# Patient Record
Sex: FEMALE | Race: WHITE | NOT HISPANIC OR LATINO | Employment: OTHER | URBAN - METROPOLITAN AREA
[De-identification: names, ages, dates, MRNs, and addresses within clinical notes are randomized per-mention and may not be internally consistent; named-entity substitution may affect disease eponyms.]

---

## 2024-11-13 ENCOUNTER — OFFICE VISIT (OUTPATIENT)
Dept: OPTOMETRY | Facility: CLINIC | Age: 39
End: 2024-11-13
Payer: COMMERCIAL

## 2024-11-13 DIAGNOSIS — S05.8X2A COMMOTIO RETINAE OF LEFT EYE, INITIAL ENCOUNTER: ICD-10-CM

## 2024-11-13 DIAGNOSIS — H20.9 TRAUMATIC IRITIS: Primary | ICD-10-CM

## 2024-11-13 PROCEDURE — 1159F MED LIST DOCD IN RCRD: CPT | Mod: CPTII,S$GLB,, | Performed by: OPTOMETRIST

## 2024-11-13 PROCEDURE — 99204 OFFICE O/P NEW MOD 45 MIN: CPT | Mod: S$GLB,,, | Performed by: OPTOMETRIST

## 2024-11-13 PROCEDURE — 99999 PR PBB SHADOW E&M-EST. PATIENT-LVL III: CPT | Mod: PBBFAC,,, | Performed by: OPTOMETRIST

## 2024-11-13 RX ORDER — CYCLOBENZAPRINE HCL 10 MG
10 TABLET ORAL 3 TIMES DAILY
COMMUNITY
Start: 2024-11-01

## 2024-11-13 RX ORDER — METHYLPREDNISOLONE 4 MG/1
TABLET ORAL
COMMUNITY
Start: 2024-11-01

## 2024-11-13 RX ORDER — DIPHENOXYLATE HYDROCHLORIDE AND ATROPINE SULFATE 2.5; .025 MG/1; MG/1
1 TABLET ORAL 4 TIMES DAILY
COMMUNITY
Start: 2024-11-01

## 2024-11-13 RX ORDER — DICYCLOMINE HYDROCHLORIDE 20 MG/1
20 TABLET ORAL 4 TIMES DAILY
COMMUNITY
Start: 2024-11-01

## 2024-11-13 NOTE — PROGRESS NOTES
HPI    Patient hit herself in the eye yesterday with bungee cord OS. Was informed   of having minor abrasion OS. States that pain OS is 8/10 but when lights   are on, it feels much worse. States that the back of her eye is what hurts   the most. Feels as though someone is squeezing her eye when light is on.   States that she can see a bit better today OS.   Erythromycin every 6 hours for the next 5 days.   Last edited by Fior Toro, OD on 11/13/2024  1:31 PM.            Assessment /Plan     For exam results, see Encounter Report.    Traumatic iritis    Commotio retinae of left eye, initial encounter      1-2. Educated pt on today's findings. Pt to decrease EES jenna use to qhs OS. Pt to begin PF qid OS x 3 days then initiate slow taper until next visit. Instilled 1 gtt PF into OS in office today without complication.  Pt to also begin 2% Cyclogyl qd OS PRN for pain relief. Instilled 1 gtt of Cyclogyl into OS in office today without complication.  No holes, tears, detachments 360 OS. (-)SS OS. Educated on s/s of RD and to RTC ASAP if occur.    RTC x 6 days for iritis recheck with SABIHA

## 2024-11-19 ENCOUNTER — OFFICE VISIT (OUTPATIENT)
Dept: OPTOMETRY | Facility: CLINIC | Age: 39
End: 2024-11-19
Payer: COMMERCIAL

## 2024-11-19 DIAGNOSIS — H20.9 TRAUMATIC IRITIS: Primary | ICD-10-CM

## 2024-11-19 DIAGNOSIS — H52.03 HYPEROPIA OF BOTH EYES: ICD-10-CM

## 2024-11-19 DIAGNOSIS — S05.8X2D COMMOTIO RETINAE OF LEFT EYE, SUBSEQUENT ENCOUNTER: ICD-10-CM

## 2024-11-19 PROCEDURE — 92014 COMPRE OPH EXAM EST PT 1/>: CPT | Mod: S$GLB,,, | Performed by: OPTOMETRIST

## 2024-11-19 PROCEDURE — 1159F MED LIST DOCD IN RCRD: CPT | Mod: CPTII,S$GLB,, | Performed by: OPTOMETRIST

## 2024-11-19 PROCEDURE — 99999 PR PBB SHADOW E&M-EST. PATIENT-LVL III: CPT | Mod: PBBFAC,,, | Performed by: OPTOMETRIST

## 2024-11-19 PROCEDURE — 92015 DETERMINE REFRACTIVE STATE: CPT | Mod: S$GLB,,, | Performed by: OPTOMETRIST

## 2024-11-19 NOTE — PROGRESS NOTES
HPI    Patient is here today for iritis recheck OS. Denies pain. Slight light   sensitivity. VA is more cleared OS but states that when she strains to see   the TV, it has slight soreness. Dryness OU.   PF BID OS  AT PRN OU   Last edited by Fior Toro, OD on 11/19/2024 11:15 AM.            Assessment /Plan     For exam results, see Encounter Report.    Traumatic iritis    Commotio retinae of left eye, subsequent encounter    Hyperopia of both eyes      1. Educated pt on today's improved findings. Pt to taper PF bid OS x 4 days, then qd OS x 4 days, then D/C.    2. Condition resolved    3.   Eyeglass Final Rx       Eyeglass Final Rx         Sphere Cylinder Axis    Right -0.25 +0.50 060    Left +0.25 Sphere       Type: SVL with AR coating    Expiration Date: 11/19/2025   Opposite signs OK                  RTC PRN

## 2024-12-18 ENCOUNTER — TELEPHONE (OUTPATIENT)
Dept: PSYCHIATRY | Facility: CLINIC | Age: 39
End: 2024-12-18
Payer: COMMERCIAL

## 2024-12-18 NOTE — TELEPHONE ENCOUNTER
"                               For documentation purposes    Patient called wanting to schedule an appointment with one of our providers.    States, " I was told that I can see someone there to help me with all my issues."    This author asked if a referral was done with diagnosis.    States, " No, I took a CDC test on line and it came back as Autism, PTSD, Depression, Anxiety and Bipolar and they were all sever"    This author informed patient she was seeing Dr. Mendez and Dr. Parada at one time.    States, " I didn't particularly care for them so I just didn't go back."    Patient is aware a referral is needed to be placed on our wait list and the internal ie [facility] or affiliated partners with [facility] are scheduled first.      Patient acknowledged understanding.  Maisha     "

## 2024-12-27 ENCOUNTER — PATIENT MESSAGE (OUTPATIENT)
Dept: OPTOMETRY | Facility: CLINIC | Age: 39
End: 2024-12-27
Payer: COMMERCIAL